# Patient Record
Sex: FEMALE | Race: WHITE | NOT HISPANIC OR LATINO | Employment: FULL TIME | URBAN - NONMETROPOLITAN AREA
[De-identification: names, ages, dates, MRNs, and addresses within clinical notes are randomized per-mention and may not be internally consistent; named-entity substitution may affect disease eponyms.]

---

## 2020-03-26 ENCOUNTER — OFFICE VISIT (OUTPATIENT)
Dept: OBSTETRICS AND GYNECOLOGY | Facility: CLINIC | Age: 50
End: 2020-03-26

## 2020-03-26 VITALS
BODY MASS INDEX: 44.79 KG/M2 | DIASTOLIC BLOOD PRESSURE: 76 MMHG | WEIGHT: 268.8 LBS | HEIGHT: 65 IN | SYSTOLIC BLOOD PRESSURE: 120 MMHG

## 2020-03-26 DIAGNOSIS — D21.9 FIBROIDS: ICD-10-CM

## 2020-03-26 DIAGNOSIS — N92.0 MENORRHAGIA WITH REGULAR CYCLE: Primary | ICD-10-CM

## 2020-03-26 DIAGNOSIS — N94.6 DYSMENORRHEA: ICD-10-CM

## 2020-03-26 PROCEDURE — 99204 OFFICE O/P NEW MOD 45 MIN: CPT | Performed by: PHYSICIAN ASSISTANT

## 2020-03-26 RX ORDER — HYDROXYZINE PAMOATE 50 MG/1
CAPSULE ORAL
COMMUNITY
Start: 2020-02-06 | End: 2020-09-15

## 2020-03-26 NOTE — PATIENT INSTRUCTIONS
Will check labs today and contact patient with results when available  Pending results will discuss further management for menorrhagia.  If she is not close to menopausal range with FSH discussed the option of possible Mirena IUD which would cover birth control and also most likely suppress her menses after 2 to 3 months.  We will plan on doing a follow-up ultrasound and about 6 months to recheck the newly discovered fibroids.

## 2020-03-26 NOTE — PROGRESS NOTES
Subjective   Chief Complaint   Patient presents with   • Menstrual Problem     Heavy bleeding and cramping x 4-5 months       Harper Pedersen is a 49 y.o. year old new patient  presenting to be seen for complaints of heavy and crampy menstrual periods.  She reports for the last 4 to 5 months she has been experiencing a heavier longer and crampy her menstrual cycle.  Previously she was experiencing a cycle about every 28 to 30 days with a 4-day moderate flow.  For the last 4 to 5 months her periods have lasted about 5 to 6 days they are much heavier changing protection every 1-2 hours.  She is also had more dysmenorrhea with the last few menstrual cycles.  Her last menstrual period was 3/23/2020 and reports her bleeding has lightened up today.  She is using condoms for birth control but is interested in other birth control options.   Reports her last Pap smear with was 2018 by her PCP Dr. Pedersen's office.  Transvaginal ultrasound is done in the office today and notes a anteverted uterus with multiple fibroids.  The largest fibroid is 3.1 cm and exophytic.  Other intramural fibroids ranged from 18 to 27 mm.  Endometrium is 7 mm, bilateral ovaries have small follicles but no cyst or masses.  There is no free fluid.  Ultrasound images are reviewed by myself.      Past Medical History:   Diagnosis Date   • Abnormal Pap smear of cervix    • Anxiety    • Breast cancer (CMS/HCC)    • Sleep difficulties         Current Outpatient Medications:   •  colestipol (COLESTID) 1 g tablet, Take 1 g by mouth 2 (Two) Times a Day., Disp: , Rfl:   •  hydrOXYzine pamoate (VISTARIL) 50 MG capsule, , Disp: , Rfl:   •  Loratadine-Pseudoephedrine (CLARITIN-D 12 HOUR PO), Take  by mouth., Disp: , Rfl:   •  clonazePAM (KlonoPIN) 0.5 MG tablet, Take 0.5 mg by mouth 2 (Two) Times a Day As Needed for Seizures., Disp: , Rfl:   •  predniSONE (DELTASONE) 10 MG tablet, Daily taper - 6/5/4/3/2/, Disp: 21 tablet, Rfl: 0  •   "promethazine-codeine (PHENERGAN with CODEINE) 6.25-10 MG/5ML syrup, Take 5 mL by mouth Every 4 (Four) Hours As Needed for Cough., Disp: 90 mL, Rfl: 0   Allergies   Allergen Reactions   • Hydrogen Peroxide Other (See Comments)     Blisters skin      Past Surgical History:   Procedure Laterality Date   • BREAST LUMPECTOMY Right    •  SECTION     • CHOLECYSTECTOMY     • TONSILLECTOMY        Social History     Socioeconomic History   • Marital status: Single     Spouse name: Not on file   • Number of children: Not on file   • Years of education: Not on file   • Highest education level: Not on file   Tobacco Use   • Smoking status: Current Every Day Smoker     Packs/day: 0.50   • Smokeless tobacco: Never Used   Substance and Sexual Activity   • Alcohol use: No   • Drug use: Never   • Sexual activity: Yes     Partners: Male     Birth control/protection: Condom, Spermicide      Family History   Problem Relation Age of Onset   • Breast cancer Mother    • Heart disease Father    • Hypertension Paternal Grandfather    • Breast cancer Maternal Aunt        Review of Systems   Constitutional:        Weight gain   Endocrine:        Hot flashes   Genitourinary: Positive for enuresis, frequency, menstrual problem and vaginal bleeding. Negative for difficulty urinating and dysuria.   Psychiatric/Behavioral: Positive for dysphoric mood and sleep disturbance.   All other systems reviewed and are negative.          Objective   /76   Ht 163.8 cm (64.5\")   Wt 122 kg (268 lb 12.8 oz)   LMP 2020 (Exact Date)   Breastfeeding No   BMI 45.43 kg/m²     Physical Exam   Constitutional: She is cooperative. No distress.   obese   Eyes: Conjunctivae, EOM and lids are normal.   Neck: No thyroid mass and no thyromegaly present.   Cardiovascular: Normal rate, regular rhythm and normal heart sounds.   Pulmonary/Chest: Effort normal and breath sounds normal.   Abdominal: Soft. Normal appearance. There is no tenderness. There is " no rigidity.   Genitourinary: There is no tenderness or lesion on the right labia. There is no tenderness or lesion on the left labia. Uterus is not enlarged and not tender. Cervix exhibits no motion tenderness. Right adnexum displays no mass and no tenderness. Left adnexum displays no mass and no tenderness. There is bleeding in the vagina. No tenderness in the vagina.   Genitourinary Comments: Light to moderate active bleeding   Neurological: She is alert.   Skin: Skin is warm and dry. No lesion and no rash noted.   Psychiatric: She has a normal mood and affect. Her behavior is normal. Thought content normal.            Assessment and Plan  Harper was seen today for menstrual problem.    Diagnoses and all orders for this visit:    Menorrhagia with regular cycle  -     Cancel: US Non-ob Transvaginal  -     CBC (No Diff)  -     TSH  -     Follicle Stimulating Hormone    Fibroids    Dysmenorrhea      Patient Instructions   Will check labs today and contact patient with results when available  Pending results will discuss further management for menorrhagia.  If she is not close to menopausal range with FSH discussed the option of possible Mirena IUD which would cover birth control and also most likely suppress her menses after 2 to 3 months.  We will plan on doing a follow-up ultrasound and about 6 months to recheck the newly discovered fibroids.             This note was electronically signed.    Maggie Valentin PA-C   March 26, 2020

## 2020-03-27 LAB
ERYTHROCYTE [DISTWIDTH] IN BLOOD BY AUTOMATED COUNT: 12.6 % (ref 12.3–15.4)
FSH SERPL-ACNC: 10.3 MIU/ML
HCT VFR BLD AUTO: 46.2 % (ref 34–46.6)
HGB BLD-MCNC: 15.2 G/DL (ref 12–15.9)
MCH RBC QN AUTO: 29.5 PG (ref 26.6–33)
MCHC RBC AUTO-ENTMCNC: 32.9 G/DL (ref 31.5–35.7)
MCV RBC AUTO: 89.7 FL (ref 79–97)
PLATELET # BLD AUTO: 309 10*3/MM3 (ref 140–450)
RBC # BLD AUTO: 5.15 10*6/MM3 (ref 3.77–5.28)
TSH SERPL DL<=0.005 MIU/L-ACNC: 2.17 UIU/ML (ref 0.27–4.2)
WBC # BLD AUTO: 7.79 10*3/MM3 (ref 3.4–10.8)

## 2020-06-04 ENCOUNTER — OFFICE VISIT (OUTPATIENT)
Dept: OBSTETRICS AND GYNECOLOGY | Facility: CLINIC | Age: 50
End: 2020-06-04

## 2020-06-04 VITALS
BODY MASS INDEX: 46.85 KG/M2 | DIASTOLIC BLOOD PRESSURE: 82 MMHG | SYSTOLIC BLOOD PRESSURE: 130 MMHG | WEIGHT: 281.2 LBS | HEIGHT: 65 IN

## 2020-06-04 DIAGNOSIS — Z12.4 SCREENING FOR CERVICAL CANCER: ICD-10-CM

## 2020-06-04 DIAGNOSIS — D21.9 FIBROIDS: ICD-10-CM

## 2020-06-04 DIAGNOSIS — Z01.419 ENCOUNTER FOR GYNECOLOGICAL EXAMINATION WITHOUT ABNORMAL FINDING: Primary | ICD-10-CM

## 2020-06-04 PROCEDURE — 99396 PREV VISIT EST AGE 40-64: CPT | Performed by: PHYSICIAN ASSISTANT

## 2020-06-04 NOTE — PROGRESS NOTES
Subjective   Chief Complaint   Patient presents with   • Gynecologic Exam     Last pap done in , MMG is due in August, No complaints       Harper Pedersen is a 49 y.o. year old  presenting to be seen for her annual gynecological exam.   She has no complaints or concerns.  She has her screening mammogram scheduled in August at [facility].  She has a history of breast cancer with a right lumpectomy  and radiation.  Her last menstrual period was 2020.  She was found to have uterine fibroids in March when being evaluated for heavy menses.  A follow-up ultrasound for surveillance of fibroids was recommended which will be due in September.    Past Medical History:   Diagnosis Date   • Abnormal Pap smear of cervix    • Anxiety    • Breast cancer (CMS/HCC)    • Sleep difficulties         Current Outpatient Medications:   •  colestipol (COLESTID) 1 g tablet, Take 1 g by mouth 2 (Two) Times a Day., Disp: , Rfl:   •  hydrOXYzine pamoate (VISTARIL) 50 MG capsule, , Disp: , Rfl:   •  Loratadine-Pseudoephedrine (CLARITIN-D 12 HOUR PO), Take  by mouth., Disp: , Rfl:    Allergies   Allergen Reactions   • Hydrogen Peroxide Other (See Comments)     Blisters skin      Past Surgical History:   Procedure Laterality Date   • BREAST LUMPECTOMY Right    •  SECTION     • CHOLECYSTECTOMY     • TONSILLECTOMY        Social History     Socioeconomic History   • Marital status: Single     Spouse name: Not on file   • Number of children: Not on file   • Years of education: Not on file   • Highest education level: Not on file   Tobacco Use   • Smoking status: Current Every Day Smoker     Packs/day: 0.50   • Smokeless tobacco: Never Used   Substance and Sexual Activity   • Alcohol use: No   • Drug use: Never   • Sexual activity: Yes     Partners: Male     Birth control/protection: Condom, Spermicide      Family History   Problem Relation Age of Onset   • Breast cancer Mother    • Heart disease Father    •  "Hypertension Paternal Grandfather    • Breast cancer Maternal Aunt        Review of Systems   Constitutional: Negative for chills, diaphoresis and fever.   Gastrointestinal: Negative for abdominal pain, diarrhea, nausea and vomiting.   Genitourinary: Negative for difficulty urinating, dysuria, menstrual problem, pelvic pain, vaginal bleeding and vaginal discharge.           Objective   /82   Ht 163.8 cm (64.5\")   Wt 128 kg (281 lb 3.2 oz)   LMP 05/22/2020 (Exact Date)   Breastfeeding No   BMI 47.52 kg/m²     Physical Exam   Constitutional: She is cooperative. No distress.   obese   Eyes: Conjunctivae, EOM and lids are normal.   Pulmonary/Chest: Right breast exhibits no inverted nipple, no mass, no nipple discharge, no skin change and no tenderness. Left breast exhibits no inverted nipple, no mass, no nipple discharge, no skin change and no tenderness.   Abdominal: Soft. Normal appearance. There is no tenderness.   Genitourinary: Uterus normal. There is no tenderness or lesion on the right labia. There is no tenderness or lesion on the left labia. Cervix exhibits no motion tenderness, no discharge and no friability. Right adnexum displays no mass and no tenderness. Left adnexum displays no mass and no tenderness. No erythema, tenderness or bleeding in the vagina. No vaginal discharge found.   Genitourinary Comments: Pap done   Musculoskeletal: Normal range of motion.   Neurological: She is alert.   Skin: Skin is warm and dry. No lesion and no rash noted.   Psychiatric: She has a normal mood and affect. Her behavior is normal. Thought content normal.            Assessment and Plan  Harper was seen today for gynecologic exam.    Diagnoses and all orders for this visit:    Encounter for gynecological examination without abnormal finding    Screening for cervical cancer  -     Liquid-based Pap Smear, Screening; Future    Fibroids      Patient Instructions   Encourage self breast exam monthly  Regular " exercise  Follow up September for repeat pelvic ultrasound to check fibroids             This note was electronically signed.    Maggie Valentin PA-C   June 4, 2020

## 2020-06-17 DIAGNOSIS — Z12.4 SCREENING FOR CERVICAL CANCER: ICD-10-CM

## 2020-09-04 ENCOUNTER — APPOINTMENT (OUTPATIENT)
Dept: GENERAL RADIOLOGY | Facility: HOSPITAL | Age: 50
End: 2020-09-04

## 2020-09-04 ENCOUNTER — HOSPITAL ENCOUNTER (EMERGENCY)
Facility: HOSPITAL | Age: 50
Discharge: HOME | End: 2020-09-04
Attending: STUDENT IN AN ORGANIZED HEALTH CARE EDUCATION/TRAINING PROGRAM | Admitting: STUDENT IN AN ORGANIZED HEALTH CARE EDUCATION/TRAINING PROGRAM

## 2020-09-04 VITALS
DIASTOLIC BLOOD PRESSURE: 108 MMHG | OXYGEN SATURATION: 97 % | HEIGHT: 64 IN | SYSTOLIC BLOOD PRESSURE: 148 MMHG | BODY MASS INDEX: 46.95 KG/M2 | WEIGHT: 275 LBS | HEART RATE: 80 BPM | RESPIRATION RATE: 16 BRPM | TEMPERATURE: 98.4 F

## 2020-09-04 DIAGNOSIS — R10.9 LEFT FLANK PAIN: Primary | ICD-10-CM

## 2020-09-04 LAB
ALBUMIN SERPL-MCNC: 4.1 G/DL (ref 3.5–5.2)
ALBUMIN/GLOB SERPL: 1.2 G/DL
ALP SERPL-CCNC: 91 U/L (ref 39–117)
ALT SERPL W P-5'-P-CCNC: 14 U/L (ref 1–33)
ANION GAP SERPL CALCULATED.3IONS-SCNC: 9.9 MMOL/L (ref 5–15)
AST SERPL-CCNC: 16 U/L (ref 1–32)
BACTERIA UR QL AUTO: ABNORMAL /HPF
BASOPHILS # BLD AUTO: 0.03 10*3/MM3 (ref 0–0.2)
BASOPHILS NFR BLD AUTO: 0.2 % (ref 0–1.5)
BILIRUB SERPL-MCNC: 0.3 MG/DL (ref 0–1.2)
BILIRUB UR QL STRIP: NEGATIVE
BUN SERPL-MCNC: 12 MG/DL (ref 6–20)
BUN/CREAT SERPL: 16.7 (ref 7–25)
CALCIUM SPEC-SCNC: 9.4 MG/DL (ref 8.6–10.5)
CHLORIDE SERPL-SCNC: 106 MMOL/L (ref 98–107)
CLARITY UR: CLEAR
CO2 SERPL-SCNC: 23.1 MMOL/L (ref 22–29)
COLOR UR: YELLOW
CREAT SERPL-MCNC: 0.72 MG/DL (ref 0.57–1)
DEPRECATED RDW RBC AUTO: 44 FL (ref 37–54)
EOSINOPHIL # BLD AUTO: 0 10*3/MM3 (ref 0–0.4)
EOSINOPHIL NFR BLD AUTO: 0 % (ref 0.3–6.2)
ERYTHROCYTE [DISTWIDTH] IN BLOOD BY AUTOMATED COUNT: 13.3 % (ref 12.3–15.4)
GFR SERPL CREATININE-BSD FRML MDRD: 86 ML/MIN/1.73
GLOBULIN UR ELPH-MCNC: 3.3 GM/DL
GLUCOSE SERPL-MCNC: 130 MG/DL (ref 65–99)
GLUCOSE UR STRIP-MCNC: NEGATIVE MG/DL
HCT VFR BLD AUTO: 45.5 % (ref 34–46.6)
HGB BLD-MCNC: 15.4 G/DL (ref 12–15.9)
HGB UR QL STRIP.AUTO: ABNORMAL
HYALINE CASTS UR QL AUTO: ABNORMAL /LPF
IMM GRANULOCYTES # BLD AUTO: 0.05 10*3/MM3 (ref 0–0.05)
IMM GRANULOCYTES NFR BLD AUTO: 0.4 % (ref 0–0.5)
KETONES UR QL STRIP: NEGATIVE
LEUKOCYTE ESTERASE UR QL STRIP.AUTO: NEGATIVE
LYMPHOCYTES # BLD AUTO: 1.3 10*3/MM3 (ref 0.7–3.1)
LYMPHOCYTES NFR BLD AUTO: 9.5 % (ref 19.6–45.3)
MCH RBC QN AUTO: 30.6 PG (ref 26.6–33)
MCHC RBC AUTO-ENTMCNC: 33.8 G/DL (ref 31.5–35.7)
MCV RBC AUTO: 90.3 FL (ref 79–97)
MONOCYTES # BLD AUTO: 0.55 10*3/MM3 (ref 0.1–0.9)
MONOCYTES NFR BLD AUTO: 4 % (ref 5–12)
MUCOUS THREADS URNS QL MICRO: ABNORMAL /HPF
NEUTROPHILS NFR BLD AUTO: 11.82 10*3/MM3 (ref 1.7–7)
NEUTROPHILS NFR BLD AUTO: 85.9 % (ref 42.7–76)
NITRITE UR QL STRIP: NEGATIVE
NRBC BLD AUTO-RTO: 0 /100 WBC (ref 0–0.2)
PH UR STRIP.AUTO: <=5 [PH] (ref 5–8)
PLATELET # BLD AUTO: 298 10*3/MM3 (ref 140–450)
PMV BLD AUTO: 9.6 FL (ref 6–12)
POTASSIUM SERPL-SCNC: 4.1 MMOL/L (ref 3.5–5.2)
PROT SERPL-MCNC: 7.4 G/DL (ref 6–8.5)
PROT UR QL STRIP: ABNORMAL
RBC # BLD AUTO: 5.04 10*6/MM3 (ref 3.77–5.28)
RBC # UR: ABNORMAL /HPF
REF LAB TEST METHOD: ABNORMAL
SODIUM SERPL-SCNC: 139 MMOL/L (ref 136–145)
SP GR UR STRIP: 1.03 (ref 1–1.03)
SQUAMOUS #/AREA URNS HPF: ABNORMAL /HPF
UROBILINOGEN UR QL STRIP: ABNORMAL
WBC # BLD AUTO: 13.75 10*3/MM3 (ref 3.4–10.8)
WBC UR QL AUTO: ABNORMAL /HPF

## 2020-09-04 PROCEDURE — 80053 COMPREHEN METABOLIC PANEL: CPT | Performed by: PHYSICIAN ASSISTANT

## 2020-09-04 PROCEDURE — 71045 X-RAY EXAM CHEST 1 VIEW: CPT

## 2020-09-04 PROCEDURE — 81001 URINALYSIS AUTO W/SCOPE: CPT | Performed by: PHYSICIAN ASSISTANT

## 2020-09-04 PROCEDURE — 99283 EMERGENCY DEPT VISIT LOW MDM: CPT

## 2020-09-04 PROCEDURE — 96374 THER/PROPH/DIAG INJ IV PUSH: CPT

## 2020-09-04 PROCEDURE — 25010000002 KETOROLAC TROMETHAMINE PER 15 MG: Performed by: PHYSICIAN ASSISTANT

## 2020-09-04 PROCEDURE — 85025 COMPLETE CBC W/AUTO DIFF WBC: CPT | Performed by: PHYSICIAN ASSISTANT

## 2020-09-04 RX ORDER — CYCLOBENZAPRINE HCL 10 MG
10 TABLET ORAL 3 TIMES DAILY PRN
Qty: 21 TABLET | Refills: 0 | Status: SHIPPED | OUTPATIENT
Start: 2020-09-04 | End: 2020-09-15

## 2020-09-04 RX ORDER — KETOROLAC TROMETHAMINE 30 MG/ML
30 INJECTION, SOLUTION INTRAMUSCULAR; INTRAVENOUS ONCE
Status: COMPLETED | OUTPATIENT
Start: 2020-09-04 | End: 2020-09-04

## 2020-09-04 RX ORDER — ALBUTEROL SULFATE 90 UG/1
2 AEROSOL, METERED RESPIRATORY (INHALATION) EVERY 4 HOURS PRN
COMMUNITY

## 2020-09-04 RX ORDER — CODEINE PHOSPHATE AND GUAIFENESIN 10; 100 MG/5ML; MG/5ML
5 SOLUTION ORAL 3 TIMES DAILY PRN
COMMUNITY
End: 2020-09-15

## 2020-09-04 RX ORDER — BENZONATATE 100 MG/1
100 CAPSULE ORAL 3 TIMES DAILY PRN
COMMUNITY
End: 2021-05-07

## 2020-09-04 RX ORDER — METHYLPREDNISOLONE 4 MG/1
TABLET ORAL 2 TIMES DAILY
COMMUNITY
End: 2020-09-15

## 2020-09-04 RX ORDER — SODIUM CHLORIDE 0.9 % (FLUSH) 0.9 %
10 SYRINGE (ML) INJECTION AS NEEDED
Status: DISCONTINUED | OUTPATIENT
Start: 2020-09-04 | End: 2020-09-05 | Stop reason: HOSPADM

## 2020-09-04 RX ADMIN — KETOROLAC TROMETHAMINE 30 MG: 30 INJECTION, SOLUTION INTRAMUSCULAR at 20:46

## 2020-09-05 NOTE — DISCHARGE INSTRUCTIONS
Please return to the ER if your symptoms don't improve or if they worsen. A CT scan may be necessary to further evaluate and diagnose your symptoms if you aren't getting better.

## 2020-09-05 NOTE — ED PROVIDER NOTES
Subjective   This patient states for over a week she had a cough and when she coughed about a week ago she felt some pain in her left lower abdomen/left hip/left flank area.  She states she is been taking ibuprofen since but the pain is worse now.  No nausea or vomiting.  She states she has chronic diarrhea which is unchanged.  No urinary symptoms.  No history of kidney stones.  She had a telehealth visit with her PCP yesterday and they gave her Tessalon Perles, Ventolin inhaler, methylprednisolone Dosepak and she states her cough is somewhat improving however the pain in her left side is worsening.  No fevers.          Review of Systems   Constitutional: Negative.  Negative for fever.   HENT: Negative.    Eyes: Negative.    Respiratory: Positive for cough. Negative for shortness of breath.    Cardiovascular: Negative.  Negative for chest pain.   Gastrointestinal: Negative.         Left lower abdominal pain/left flank pain/left low back pain   Genitourinary: Negative.  Negative for dysuria and hematuria.   Musculoskeletal: Negative.    Skin: Negative.  Negative for rash.   Neurological: Negative.    Psychiatric/Behavioral: Negative.        Past Medical History:   Diagnosis Date   • Abnormal Pap smear of cervix    • Anxiety    • Breast cancer (CMS/[facility])    • Sleep difficulties        Allergies   Allergen Reactions   • Hydrogen Peroxide Other (See Comments)     Blisters skin       Past Surgical History:   Procedure Laterality Date   • BREAST LUMPECTOMY Right    •  SECTION     • CHOLECYSTECTOMY     • TONSILLECTOMY         Family History   Problem Relation Age of Onset   • Breast cancer Mother    • Heart disease Father    • Hypertension Paternal Grandfather    • Breast cancer Maternal Aunt        Social History     Socioeconomic History   • Marital status: Single     Spouse name: Not on file   • Number of children: Not on file   • Years of education: Not on file   • Highest education level: Not on file   Tobacco  Use   • Smoking status: Current Every Day Smoker     Packs/day: 0.50   • Smokeless tobacco: Never Used   Substance and Sexual Activity   • Alcohol use: No   • Drug use: Never   • Sexual activity: Yes     Partners: Male     Birth control/protection: Condom, Spermicide           Objective   Physical Exam   Constitutional: She appears well-developed and well-nourished.   HENT:   Head: Normocephalic and atraumatic.   Eyes: EOM are normal.   Neck: Normal range of motion.   Cardiovascular: Normal rate and regular rhythm.   Pulmonary/Chest: Effort normal and breath sounds normal. No stridor. No respiratory distress. She has no wheezes.   Abdominal: Soft. There is no rebound and no guarding.       Musculoskeletal: Normal range of motion.   Neurological: She is alert.   Skin: Skin is warm and dry. No rash noted.   Psychiatric: She has a normal mood and affect. Her behavior is normal. Thought content normal.   Nursing note and vitals reviewed.      Procedures           ED Course                                           [facility]  2152  Patient has a history of diverticulosis but does not believe she is ever had a flare of diverticulitis.  She has no fever.  Noted white blood cell count of 13 but she is on steroids currently.  She does not seem to be in any significant discomfort until she moves or coughs and then she has increased pain in her left lower flank which would lead me to believe that this is possibly musculoskeletal etiology for pain and abdominal wall strain.  She states her symptoms are helped by holding an ice pack to her left lower flank and abdomen.  On exam there is no palpable abdominal wall hernia.  No peritoneal signs.  Patient is very apprehensive about having a CT scan due to cost.  I told her I was reassured with her blood work however cannot rule out intra-abdominal processes such as, kidney stone, vascular issue or other without CT imaging and recommended it to further assess her symptoms and cause of  pain.  She expresses understanding and states she wishes not to have a CT scan this time and wishes to return if her symptoms worsen.  She was just started on antitussives, steroids and an inhaler yesterday and today and she would like to see if improving her respiratory symptoms and decreasing the frequency of coughing helps with the pain and her left lower flank/abdomen.  We discussed that at any time if she develops worsening symptoms, fever or any other concerns she is to return to the ER and we would proceed with the CT scan and she expressed understanding and was in agreement with plan. She expresses appropriate decision making capability and states she does not wish to have CT scan of the abdomen at this time. Noted HTN, suspect secondary to discomfort.  Final diagnoses:   Left flank pain            Clinton Moraes PA-C  09/04/20 2200       Clinton Moraes PA-C  09/04/20 2204

## 2020-09-15 ENCOUNTER — OFFICE VISIT (OUTPATIENT)
Dept: OBSTETRICS AND GYNECOLOGY | Facility: CLINIC | Age: 50
End: 2020-09-15

## 2020-09-15 VITALS
DIASTOLIC BLOOD PRESSURE: 98 MMHG | BODY MASS INDEX: 47.63 KG/M2 | HEIGHT: 64 IN | WEIGHT: 279 LBS | SYSTOLIC BLOOD PRESSURE: 150 MMHG

## 2020-09-15 DIAGNOSIS — N92.0 MENORRHAGIA WITH REGULAR CYCLE: ICD-10-CM

## 2020-09-15 DIAGNOSIS — D21.9 FIBROIDS: Primary | ICD-10-CM

## 2020-09-15 PROCEDURE — 99213 OFFICE O/P EST LOW 20 MIN: CPT | Performed by: PHYSICIAN ASSISTANT

## 2020-09-15 RX ORDER — TRANEXAMIC ACID 650 MG/1
TABLET ORAL
Qty: 30 TABLET | Refills: 1 | OUTPATIENT
Start: 2020-09-15 | End: 2021-05-07

## 2020-09-15 NOTE — PROGRESS NOTES
Subjective   Chief Complaint   Patient presents with   • Follow-up     3 month follow-up TVS to check fibroids       Harper Pedersen is a 49 y.o. year old  presenting to be seen for follow-up fibroids.  Patient was seen in March of this year with complaint of menorrhagia.  Pelvic ultrasound done at that time revealed multiple fibroids and patient returns today for surveillance.  Reports no new complaints or concerns today.  Her menstrual cycles are still regular monthly however they remain heavy with  3 to 4 day flow.  Last period was 2020.  Transvaginal pelvic ultrasound done today is reviewed with patient.  It notes an anteverted uterus with stable multiple fibroids.  The largest fibroid is exophytic measuring 3 cm.  Remaining fibroids are 10 mm up to 2.3 cm.  Her endometrium is 10 mm.  Bilateral ovaries with follicles.  There is no free fluid or adnexal masses.  Ultrasound images are reviewed by myself.      Past Medical History:   Diagnosis Date   • Abnormal Pap smear of cervix    • Anxiety    • Breast cancer (CMS/HCC)    • Sleep difficulties         Current Outpatient Medications:   •  albuterol sulfate  (90 Base) MCG/ACT inhaler, Inhale 2 puffs Every 4 (Four) Hours As Needed for Wheezing., Disp: , Rfl:   •  colestipol (COLESTID) 1 g tablet, Take 1 g by mouth 2 (Two) Times a Day., Disp: , Rfl:   •  Loratadine-Pseudoephedrine (CLARITIN-D 12 HOUR PO), Take  by mouth., Disp: , Rfl:   •  benzonatate (TESSALON) 100 MG capsule, Take 100 mg by mouth 3 (Three) Times a Day As Needed for Cough., Disp: , Rfl:   •  Tranexamic Acid 650 MG tablet, Take 2 tablets po q 8 hours prn dysmenorrhea. May use up to 5 days per month, Disp: 30 tablet, Rfl: 1   Allergies   Allergen Reactions   • Hydrogen Peroxide Other (See Comments)     Blisters skin      Past Surgical History:   Procedure Laterality Date   • BREAST LUMPECTOMY Right    •  SECTION     • CHOLECYSTECTOMY     • TONSILLECTOMY        Social  "History     Socioeconomic History   • Marital status: Single     Spouse name: Not on file   • Number of children: Not on file   • Years of education: Not on file   • Highest education level: Not on file   Social Needs   • Financial resource strain: Not hard at all   • Food insecurity     Worry: Never true     Inability: Never true   • Transportation needs     Medical: No     Non-medical: No   Tobacco Use   • Smoking status: Current Every Day Smoker     Packs/day: 0.50   • Smokeless tobacco: Never Used   Substance and Sexual Activity   • Alcohol use: No   • Drug use: Never   • Sexual activity: Yes     Partners: Male     Birth control/protection: Condom, Spermicide   Lifestyle   • Physical activity     Days per week: 0 days     Minutes per session: 0 min   • Stress: Very much      Family History   Problem Relation Age of Onset   • Breast cancer Mother    • Heart disease Father    • Hypertension Paternal Grandfather    • Breast cancer Maternal Aunt        Review of Systems   Constitutional: Negative for chills, fatigue and fever.   Gastrointestinal: Negative for abdominal pain, nausea and vomiting.   Genitourinary: Positive for menstrual problem. Negative for dysuria, pelvic pain and vaginal discharge.           Objective   /98   Ht 162.6 cm (64\")   Wt 127 kg (279 lb)   LMP 09/07/2020 (Exact Date)   Breastfeeding No   BMI 47.89 kg/m²     Physical Exam         Assessment and Plan  Harper was seen today for follow-up.    Diagnoses and all orders for this visit:    Fibroids  -     US Non-ob Transvaginal    Menorrhagia with regular cycle    Other orders  -     Tranexamic Acid 650 MG tablet; Take 2 tablets po q 8 hours prn dysmenorrhea. May use up to 5 days per month      Patient Instructions   Patient is reassured her fibroids are stable.  If she does not have any change or worsening in her bleeding pattern we will follow-up ultrasound in 1 year for surveillance.  She is offered the option to try Lysteda to " lighten up her menses and she would like to try this.  We will follow-up in 3 months to review.             This note was electronically signed.    Maggie Valentin PA-C   September 16, 2020

## 2020-09-16 NOTE — PATIENT INSTRUCTIONS
Patient is reassured her fibroids are stable.  If she does not have any change or worsening in her bleeding pattern we will follow-up ultrasound in 1 year for surveillance.  She is offered the option to try Lysteda to lighten up her menses and she would like to try this.  We will follow-up in 3 months to review.

## 2021-02-01 ENCOUNTER — APPOINTMENT (OUTPATIENT)
Dept: GENERAL RADIOLOGY | Facility: HOSPITAL | Age: 51
End: 2021-02-01

## 2021-02-01 ENCOUNTER — HOSPITAL ENCOUNTER (EMERGENCY)
Facility: HOSPITAL | Age: 51
Discharge: ANOTHER HOSPITAL | End: 2021-02-01
Attending: STUDENT IN AN ORGANIZED HEALTH CARE EDUCATION/TRAINING PROGRAM | Admitting: EMERGENCY MEDICINE

## 2021-02-01 VITALS
DIASTOLIC BLOOD PRESSURE: 98 MMHG | HEART RATE: 72 BPM | BODY MASS INDEX: 46.95 KG/M2 | RESPIRATION RATE: 17 BRPM | WEIGHT: 275 LBS | HEIGHT: 64 IN | OXYGEN SATURATION: 98 % | SYSTOLIC BLOOD PRESSURE: 125 MMHG | TEMPERATURE: 97.8 F

## 2021-02-01 DIAGNOSIS — S52.201A RADIUS/ULNA FRACTURE, RIGHT, CLOSED, INITIAL ENCOUNTER: Primary | ICD-10-CM

## 2021-02-01 DIAGNOSIS — S52.91XA RADIUS/ULNA FRACTURE, RIGHT, CLOSED, INITIAL ENCOUNTER: Primary | ICD-10-CM
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
## 2021-02-01 PROCEDURE — 25010000002 HYDROMORPHONE 1 MG/ML SOLUTION: Performed by: EMERGENCY MEDICINE

## 2021-02-01 PROCEDURE — 96375 TX/PRO/DX INJ NEW DRUG ADDON: CPT

## 2021-02-01 PROCEDURE — 73060 X-RAY EXAM OF HUMERUS: CPT

## 2021-02-01 PROCEDURE — 99283 EMERGENCY DEPT VISIT LOW MDM: CPT

## 2021-02-01 PROCEDURE — 96376 TX/PRO/DX INJ SAME DRUG ADON: CPT

## 2021-02-01 PROCEDURE — 25010000002 ONDANSETRON PER 1 MG: Performed by: EMERGENCY MEDICINE

## 2021-02-01 PROCEDURE — 73090 X-RAY EXAM OF FOREARM: CPT

## 2021-02-01 PROCEDURE — 96374 THER/PROPH/DIAG INJ IV PUSH: CPT

## 2021-02-01 PROCEDURE — 73080 X-RAY EXAM OF ELBOW: CPT

## 2021-02-01 RX ORDER — ONDANSETRON 2 MG/ML
4 INJECTION INTRAMUSCULAR; INTRAVENOUS ONCE
Status: COMPLETED | OUTPATIENT
Start: 2021-02-01 | End: 2021-02-01

## 2021-02-01 RX ORDER — KETAMINE HCL IN NACL, ISO-OSM 100MG/10ML
0.2 SYRINGE (ML) INJECTION ONCE
Status: COMPLETED | OUTPATIENT
Start: 2021-02-01 | End: 2021-02-01

## 2021-02-01 RX ORDER — SODIUM CHLORIDE 0.9 % (FLUSH) 0.9 %
10 SYRINGE (ML) INJECTION AS NEEDED
Status: DISCONTINUED | OUTPATIENT
Start: 2021-02-01 | End: 2021-02-02 | Stop reason: HOSPADM

## 2021-02-01 RX ADMIN — ONDANSETRON 4 MG: 2 INJECTION INTRAMUSCULAR; INTRAVENOUS at 19:10

## 2021-02-01 RX ADMIN — HYDROMORPHONE HYDROCHLORIDE 1 MG: 1 INJECTION, SOLUTION INTRAMUSCULAR; INTRAVENOUS; SUBCUTANEOUS at 19:11

## 2021-02-01 RX ADMIN — Medication 25 MG: at 21:44

## 2021-02-01 RX ADMIN — HYDROMORPHONE HYDROCHLORIDE 1 MG: 1 INJECTION, SOLUTION INTRAMUSCULAR; INTRAVENOUS; SUBCUTANEOUS at 21:38

## 2021-02-02 NOTE — ED NOTES
Called [facility] per Zain. Spoke with Addie, which paged Dr. Horton. Call transferred to Zain and face sheet sent at this time.           Maki Calero  02/01/21 2128

## 2021-02-02 NOTE — ED PROVIDER NOTES
Subjective   This is a 50-year-old female who presents to the emergency department chief complaint fall just prior to arrival.  Patient states that she slipped on ice falling on concrete.  Patient completed right elbow, forearm pain.  Patient denies any head injury or loss of consciousness.  Patient denies any other associated complaints at this time.       History provided by:  Patient   used: No    Fall  Mechanism of injury: fall    Injury location:  Shoulder/arm  Shoulder/arm injury location:  R elbow, R forearm and R upper arm  Incident location:  Home  Time since incident:  1 day  Arrived directly from scene: no    Fall:     Fall occurred:  Standing    Impact surface:  Hard floor    Entrapped after fall: no    Protective equipment: none    Suspicion of alcohol use: no    Suspicion of drug use: no    Tetanus status:  Unknown  Prior to arrival data:     Bystander interventions:  None    Patient ambulatory at scene: no      Blood loss:  None    Orientation at scene:  Person, place, situation and time    Loss of consciousness: no      Amnesic to event: no      Airway interventions:  None    Breathing interventions:  None    IV access status:  None    IO access:  None    Fluids administered:  None    Cardiac interventions:  None    Medications administered:  None    Immobilization:  None  Associated symptoms: no abdominal pain, no back pain, no blindness, no headaches, no hearing loss, no loss of consciousness, no nausea, no neck pain, no seizures and no vomiting    Risk factors: no anticoagulation therapy, no asthma, no beta blocker therapy, no CABG, no CHF, no COPD, no diabetes, no hemophilia, no kidney disease, no pacemaker, no past MI and not pregnant        Review of Systems   Constitutional: Negative for chills, diaphoresis, fatigue and fever.   HENT: Negative for hearing loss.    Eyes: Negative.  Negative for blindness, photophobia, pain, redness and itching.   Respiratory: Negative.   Negative for cough, choking and shortness of breath.    Cardiovascular: Negative.    Gastrointestinal: Negative.  Negative for abdominal pain, nausea and vomiting.   Endocrine: Negative.  Negative for heat intolerance, polydipsia and polyphagia.   Genitourinary: Negative.  Negative for dyspareunia, dysuria, enuresis, flank pain, frequency, genital sores and hematuria.   Musculoskeletal: Positive for arthralgias and joint swelling. Negative for back pain and neck pain.   Skin: Negative for color change and pallor.   Allergic/Immunologic: Negative.    Neurological: Negative.  Negative for seizures, loss of consciousness and headaches.   Hematological: Negative.  Negative for adenopathy. Does not bruise/bleed easily.   All other systems reviewed and are negative.      Past Medical History:   Diagnosis Date   • Abnormal Pap smear of cervix    • Anxiety    • Breast cancer ([facility])    • Sleep difficulties        Allergies   Allergen Reactions   • Hydrogen Peroxide Other (See Comments)     Blisters skin       Past Surgical History:   Procedure Laterality Date   • BREAST LUMPECTOMY Right    •  SECTION     • CHOLECYSTECTOMY     • TONSILLECTOMY         Family History   Problem Relation Age of Onset   • Breast cancer Mother    • Heart disease Father    • Hypertension Paternal Grandfather    • Breast cancer Maternal Aunt        Social History     Socioeconomic History   • Marital status: Single     Spouse name: Not on file   • Number of children: Not on file   • Years of education: Not on file   • Highest education level: Not on file   Social Needs   • Financial resource strain: Not hard at all   • Food insecurity     Worry: Never true     Inability: Never true   • Transportation needs     Medical: No     Non-medical: No   Tobacco Use   • Smoking status: Current Every Day Smoker     Packs/day: 0.50     Types: Cigarettes   • Smokeless tobacco: Never Used   Substance and Sexual Activity   • Alcohol use: Yes     Comment:  Occ.    • Drug use: Never   • Sexual activity: Yes     Partners: Male     Birth control/protection: Condom, Spermicide   Lifestyle   • Physical activity     Days per week: 0 days     Minutes per session: 0 min   • Stress: Very much           Objective   Physical Exam  Vitals signs and nursing note reviewed.   Constitutional:       General: She is not in acute distress.     Appearance: Normal appearance. She is normal weight. She is not ill-appearing, toxic-appearing or diaphoretic.   HENT:      Head: Normocephalic and atraumatic.      Right Ear: Tympanic membrane, ear canal and external ear normal. There is no impacted cerumen.      Left Ear: Tympanic membrane, ear canal and external ear normal. There is no impacted cerumen.      Nose: Nose normal. No congestion or rhinorrhea.      Mouth/Throat:      Mouth: Mucous membranes are moist.      Pharynx: Oropharynx is clear. No oropharyngeal exudate or posterior oropharyngeal erythema.   Eyes:      General: No scleral icterus.        Right eye: No discharge.         Left eye: No discharge.      Extraocular Movements: Extraocular movements intact.      Conjunctiva/sclera: Conjunctivae normal.      Pupils: Pupils are equal, round, and reactive to light.   Neck:      Musculoskeletal: Normal range of motion and neck supple. No neck rigidity or muscular tenderness.      Vascular: No carotid bruit.   Cardiovascular:      Rate and Rhythm: Normal rate and regular rhythm.      Pulses: Normal pulses.      Heart sounds: Normal heart sounds. No murmur. No friction rub. No gallop.    Pulmonary:      Effort: Pulmonary effort is normal. No respiratory distress.      Breath sounds: Normal breath sounds. No stridor. No wheezing, rhonchi or rales.   Chest:      Chest wall: No tenderness.   Abdominal:      General: Abdomen is flat. Bowel sounds are normal. There is no distension.      Palpations: Abdomen is soft. There is no mass.      Tenderness: There is no abdominal tenderness. There is  no right CVA tenderness, left CVA tenderness, guarding or rebound.      Hernia: No hernia is present.   Musculoskeletal:         General: Swelling, tenderness and signs of injury present.      Right elbow: She exhibits decreased range of motion and swelling. Tenderness found.      Right forearm: She exhibits tenderness, bony tenderness and swelling.   Lymphadenopathy:      Cervical: No cervical adenopathy.   Skin:     General: Skin is warm and dry.      Capillary Refill: Capillary refill takes less than 2 seconds.      Coloration: Skin is not jaundiced or pale.      Findings: No bruising, erythema, lesion or rash.   Neurological:      General: No focal deficit present.      Mental Status: She is alert and oriented to person, place, and time.      Cranial Nerves: No cranial nerve deficit.      Sensory: No sensory deficit.      Motor: No weakness.      Coordination: Coordination normal.      Gait: Gait normal.      Deep Tendon Reflexes: Reflexes normal.   Psychiatric:         Mood and Affect: Mood normal.         Thought Content: Thought content normal.         Splint - Cast - Strapping    Date/Time: 2/4/2021 8:30 AM  Performed by: Pete Machuca PA-C  Authorized by: Abebe Andrade MD     Consent:     Consent obtained:  Verbal    Consent given by:  Patient    Risks discussed:  Discoloration, pain and swelling    Alternatives discussed:  No treatment  Pre-procedure details:     Sensation:  Normal    Skin color:  Pink   Procedure details:     Laterality:  Right    Location:  Elbow    Elbow:  R elbow    Strapping: no      Cast type:  Long arm    Splint type:  Long arm    Supplies:  Ortho-Glass  Post-procedure details:     Pain:  Improved    Sensation:  Normal    Skin color:  N/v intact     Patient tolerance of procedure:  Tolerated well, no immediate complications  Comments:      N/v intact                ED Course  ED Course as of Feb 04 0830 Mon Feb 01, 2021 2114 Call placed to Dr. Franklin.    []   2115  Recommend patient be sent to trauma center or upper extremity specialists for complex fracture.    []   2125 Discussed care with Dr. Horton, orthopedic surgery at [facility]. Patient will be accept for transfer.     []      ED Course User Index  [] Pete Machuca PA-C                                           [facility]    Final diagnoses:   Radius/ulna fracture, right, closed, initial encounter   Fall, initial encounter            Pete Machuca PA-C  02/01/21 2130       Pete Machuca PA-C  02/04/21 0830

## 2021-12-14 ENCOUNTER — LAB (OUTPATIENT)
Dept: LAB | Facility: HOSPITAL | Age: 51
End: 2021-12-14

## 2021-12-14 ENCOUNTER — TRANSCRIBE ORDERS (OUTPATIENT)
Dept: LAB | Facility: HOSPITAL | Age: 51
End: 2021-12-14

## 2021-12-14 ENCOUNTER — TRANSCRIBE ORDERS (OUTPATIENT)
Dept: GENERAL RADIOLOGY | Facility: HOSPITAL | Age: 51
End: 2021-12-14

## 2021-12-14 ENCOUNTER — HOSPITAL ENCOUNTER (OUTPATIENT)
Dept: GENERAL RADIOLOGY | Facility: HOSPITAL | Age: 51
Discharge: HOME | End: 2021-12-14

## 2021-12-14 DIAGNOSIS — E55.9 VITAMIN D DEFICIENCY: ICD-10-CM

## 2021-12-14 DIAGNOSIS — R05.9 COUGH: ICD-10-CM

## 2021-12-14 DIAGNOSIS — R53.83 TIREDNESS: Primary | ICD-10-CM

## 2021-12-14 DIAGNOSIS — E78.00 PURE HYPERCHOLESTEROLEMIA: ICD-10-CM

## 2021-12-14 DIAGNOSIS — R07.81 RIB PAIN: ICD-10-CM

## 2021-12-14 DIAGNOSIS — R73.03 PRE-DIABETES: ICD-10-CM

## 2021-12-14 DIAGNOSIS — I10 ESSENTIAL HYPERTENSION, MALIGNANT: ICD-10-CM

## 2021-12-14 DIAGNOSIS — D51.3 OTHER DIETARY VITAMIN B12 DEFICIENCY ANEMIA: ICD-10-CM

## 2021-12-14 DIAGNOSIS — R05.9 COUGH: Primary | ICD-10-CM

## 2021-12-14 DIAGNOSIS — R53.83 TIREDNESS: ICD-10-CM

## 2021-12-14 LAB
BASOPHILS # BLD AUTO: 0.05 10*3/MM3 (ref 0–0.2)
BASOPHILS NFR BLD AUTO: 0.5 % (ref 0–1.5)
DEPRECATED RDW RBC AUTO: 41.6 FL (ref 37–54)
EOSINOPHIL # BLD AUTO: 0.3 10*3/MM3 (ref 0–0.4)
EOSINOPHIL NFR BLD AUTO: 3 % (ref 0.3–6.2)
ERYTHROCYTE [DISTWIDTH] IN BLOOD BY AUTOMATED COUNT: 13 % (ref 12.3–15.4)
HCT VFR BLD AUTO: 44.7 % (ref 34–46.6)
HGB BLD-MCNC: 15 G/DL (ref 12–15.9)
IMM GRANULOCYTES # BLD AUTO: 0.04 10*3/MM3 (ref 0–0.05)
IMM GRANULOCYTES NFR BLD AUTO: 0.4 % (ref 0–0.5)
LYMPHOCYTES # BLD AUTO: 1.76 10*3/MM3 (ref 0.7–3.1)
LYMPHOCYTES NFR BLD AUTO: 17.6 % (ref 19.6–45.3)
MCH RBC QN AUTO: 29.6 PG (ref 26.6–33)
MCHC RBC AUTO-ENTMCNC: 33.6 G/DL (ref 31.5–35.7)
MCV RBC AUTO: 88.3 FL (ref 79–97)
MONOCYTES # BLD AUTO: 0.65 10*3/MM3 (ref 0.1–0.9)
MONOCYTES NFR BLD AUTO: 6.5 % (ref 5–12)
NEUTROPHILS NFR BLD AUTO: 7.22 10*3/MM3 (ref 1.7–7)
NEUTROPHILS NFR BLD AUTO: 72 % (ref 42.7–76)
NRBC BLD AUTO-RTO: 0 /100 WBC (ref 0–0.2)
PLATELET # BLD AUTO: 307 10*3/MM3 (ref 140–450)
PMV BLD AUTO: 10 FL (ref 6–12)
RBC # BLD AUTO: 5.06 10*6/MM3 (ref 3.77–5.28)
TSH SERPL DL<=0.05 MIU/L-ACNC: 2.59 UIU/ML (ref 0.27–4.2)
WBC NRBC COR # BLD: 10.02 10*3/MM3 (ref 3.4–10.8)

## 2021-12-14 PROCEDURE — 71046 X-RAY EXAM CHEST 2 VIEWS: CPT

## 2021-12-14 PROCEDURE — 80061 LIPID PANEL: CPT

## 2021-12-14 PROCEDURE — 82746 ASSAY OF FOLIC ACID SERUM: CPT

## 2021-12-14 PROCEDURE — 82306 VITAMIN D 25 HYDROXY: CPT

## 2021-12-14 PROCEDURE — 85025 COMPLETE CBC W/AUTO DIFF WBC: CPT

## 2021-12-14 PROCEDURE — 84443 ASSAY THYROID STIM HORMONE: CPT

## 2021-12-14 PROCEDURE — 83735 ASSAY OF MAGNESIUM: CPT

## 2021-12-14 PROCEDURE — 36415 COLL VENOUS BLD VENIPUNCTURE: CPT

## 2021-12-14 PROCEDURE — 83036 HEMOGLOBIN GLYCOSYLATED A1C: CPT

## 2021-12-14 PROCEDURE — 83704 LIPOPROTEIN BLD QUAN PART: CPT

## 2021-12-14 PROCEDURE — 80053 COMPREHEN METABOLIC PANEL: CPT

## 2021-12-14 PROCEDURE — 82607 VITAMIN B-12: CPT

## 2021-12-15 LAB
25(OH)D3 SERPL-MCNC: 32.6 NG/ML (ref 30–100)
ALBUMIN SERPL-MCNC: 3.8 G/DL (ref 3.5–5.2)
ALBUMIN/GLOB SERPL: 1.1 G/DL
ALP SERPL-CCNC: 92 U/L (ref 39–117)
ALT SERPL W P-5'-P-CCNC: 18 U/L (ref 1–33)
ANION GAP SERPL CALCULATED.3IONS-SCNC: 10 MMOL/L (ref 5–15)
AST SERPL-CCNC: 18 U/L (ref 1–32)
BILIRUB SERPL-MCNC: 0.3 MG/DL (ref 0–1.2)
BUN SERPL-MCNC: 11 MG/DL (ref 6–20)
BUN/CREAT SERPL: 14.7 (ref 7–25)
CALCIUM SPEC-SCNC: 9.3 MG/DL (ref 8.6–10.5)
CHLORIDE SERPL-SCNC: 105 MMOL/L (ref 98–107)
CO2 SERPL-SCNC: 25 MMOL/L (ref 22–29)
CREAT SERPL-MCNC: 0.75 MG/DL (ref 0.57–1)
FOLATE SERPL-MCNC: 5.67 NG/ML (ref 4.78–24.2)
GFR SERPL CREATININE-BSD FRML MDRD: 81 ML/MIN/1.73
GLOBULIN UR ELPH-MCNC: 3.4 GM/DL
GLUCOSE SERPL-MCNC: 91 MG/DL (ref 65–99)
HBA1C MFR BLD: 5.43 % (ref 4.8–5.6)
MAGNESIUM SERPL-MCNC: 2.3 MG/DL (ref 1.6–2.6)
POTASSIUM SERPL-SCNC: 4 MMOL/L (ref 3.5–5.2)
PROT SERPL-MCNC: 7.2 G/DL (ref 6–8.5)
SODIUM SERPL-SCNC: 140 MMOL/L (ref 136–145)
VIT B12 BLD-MCNC: 385 PG/ML (ref 211–946)

## 2021-12-17 LAB
CHOLEST SERPL-MCNC: 162 MG/DL (ref 100–199)
HDL SERPL-SCNC: 38.7 UMOL/L
HDLC SERPL-MCNC: 68 MG/DL
LDL SERPL QN: 21.5 NM
LDL SERPL-SCNC: 620 NMOL/L
LDL SMALL SERPL-SCNC: <90 NMOL/L
LDLC SERPL CALC-MCNC: 78 MG/DL (ref 0–99)
TRIGL SERPL-MCNC: 88 MG/DL (ref 0–149)